# Patient Record
Sex: FEMALE | Race: WHITE | URBAN - METROPOLITAN AREA
[De-identification: names, ages, dates, MRNs, and addresses within clinical notes are randomized per-mention and may not be internally consistent; named-entity substitution may affect disease eponyms.]

---

## 2020-11-18 ENCOUNTER — OFFICE VISIT (OUTPATIENT)
Dept: URBAN - METROPOLITAN AREA CLINIC 105 | Facility: CLINIC | Age: 50
End: 2020-11-18
Payer: OTHER GOVERNMENT

## 2020-11-18 ENCOUNTER — LAB OUTSIDE AN ENCOUNTER (OUTPATIENT)
Dept: URBAN - METROPOLITAN AREA CLINIC 105 | Facility: CLINIC | Age: 50
End: 2020-11-18

## 2020-11-18 DIAGNOSIS — K62.5 RECTAL BLEED: ICD-10-CM

## 2020-11-18 DIAGNOSIS — D13.4 HEPATIC ADENOMA: ICD-10-CM

## 2020-11-18 DIAGNOSIS — K21.00 GASTROESOPHAGEAL REFLUX DISEASE WITH ESOPHAGITIS WITHOUT HEMORRHAGE: ICD-10-CM

## 2020-11-18 DIAGNOSIS — K59.09 CHRONIC CONSTIPATION: ICD-10-CM

## 2020-11-18 DIAGNOSIS — R74.8 ELEVATED LIVER ENZYMES: ICD-10-CM

## 2020-11-18 DIAGNOSIS — K60.2 ANAL FISSURE: ICD-10-CM

## 2020-11-18 DIAGNOSIS — Z12.11 SCREEN FOR COLON CANCER: ICD-10-CM

## 2020-11-18 DIAGNOSIS — K76.0 FATTY LIVER: ICD-10-CM

## 2020-11-18 DIAGNOSIS — R10.32 LLQ ABDOMINAL PAIN: ICD-10-CM

## 2020-11-18 PROCEDURE — 3017F COLORECTAL CA SCREEN DOC REV: CPT | Performed by: INTERNAL MEDICINE

## 2020-11-18 PROCEDURE — G8427 DOCREV CUR MEDS BY ELIG CLIN: HCPCS | Performed by: INTERNAL MEDICINE

## 2020-11-18 PROCEDURE — G8482 FLU IMMUNIZE ORDER/ADMIN: HCPCS | Performed by: INTERNAL MEDICINE

## 2020-11-18 PROCEDURE — G8417 CALC BMI ABV UP PARAM F/U: HCPCS | Performed by: INTERNAL MEDICINE

## 2020-11-18 PROCEDURE — G9903 PT SCRN TBCO ID AS NON USER: HCPCS | Performed by: INTERNAL MEDICINE

## 2020-11-18 PROCEDURE — 99214 OFFICE O/P EST MOD 30 MIN: CPT | Performed by: INTERNAL MEDICINE

## 2020-11-18 RX ORDER — CALCIUM CARBONATE 260MG(650)
TABLET,CHEWABLE ORAL
Qty: 0 | Refills: 0 | Status: ACTIVE | COMMUNITY
Start: 1900-01-01

## 2020-11-18 RX ORDER — LEVOTHYROXINE SODIUM 0.15 MG/1
TABLET ORAL
Qty: 0 | Refills: 0 | Status: ACTIVE | COMMUNITY
Start: 1900-01-01

## 2020-11-18 RX ORDER — LINACLOTIDE 145 UG/1
1 CAPSULE AT LEAST 30 MINUTES BEFORE THE FIRST MEAL OF THE DAY ON AN EMPTY STOMACH CAPSULE, GELATIN COATED ORAL ONCE A DAY
Status: ACTIVE | COMMUNITY

## 2020-11-18 NOTE — HPI-TODAY'S VISIT:
2018 48 yo lady pt of Dr Darin Mcgill. She feels good - tired but good. Her father had a heart attack and her son got deployed to [place]. she says Amitiza stopped working on her. She tried linzess 290 and the first few days she had daily BMs - no more abdominal distension, nausea or discomfort. She then got diarrhea and palpitations and then she tried Linzess 145 "I have no abdominal distension! Violeta never felt so good about my GI, I can zip my pants up!!!". She says "it was horrible! I felt like I was pregnant all the time!". She says LInzess is making her drink more water and she is happy about this. Her daughter went to [place] after law school for a month.  1/6/20 Her mother had a fall and fractured her ankles. Her son is back from [place]. Last week she started to have rectal pain severe, worse with BMs, "it just hurts". Intermittent blood mixed in stool. Hurts when she sits down as well. She feels some rectal swelling. BMs are fine on AMitiza. She only takes AMitiza 8 mcg once a day in the am when she gets home. this is because she has been doing night shifts. Constipation is worse during menses. She has been having LLQ discomfort, better with a BM.  2/3/20 Discussed KUB results in detail. She says she increased amitiza to 8 mcg tid erroneously. She still felt bloated and full. She stopped Amitiza for 2 days. Took linzess 145 "that gave me better results". Bloating "went down". Stools were watery at first, now soft and no longer watery. It is more reliable as she has BMs within 2 hours of taking it. She still has mild LLQ discomfort but overall improved. SHe also hs dysfunctional uterine bleeding, takes iron and this worsens her constipation. She bleeds for 9-10 days and cycle is less than 3 weeks.  11/18/20 c/o lots of abdominal bloating. Says "I like to see you once a year and maybe my symptoms are because I just finished 1000 hours of clinical". Her linzess has changed "it went from soft BMs to watery stools". She is on 145 mcg "its unusual because that is what works for me". She was still feeling incompletely evacuated. She then took miralax and felt better evacuated.  Her LLQ discomfort is back "that must be because I'm not emptying completely. She has a L breast mass "bx is negative but they will remove it still in January". Says due to this had scans done and "they did an MRI showing my liver masses are back". She saw GYN "my GYN Says my uterus has to come out because Im bleeding too much. 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 Labs show nl CBC and CMP wnl

## 2021-01-04 ENCOUNTER — OFFICE VISIT (OUTPATIENT)
Dept: URBAN - METROPOLITAN AREA SURGERY CENTER 16 | Facility: SURGERY CENTER | Age: 51
End: 2021-01-04

## 2021-01-29 ENCOUNTER — CLAIMS CREATED FROM THE CLAIM WINDOW (OUTPATIENT)
Dept: URBAN - METROPOLITAN AREA CLINIC 4 | Facility: CLINIC | Age: 51
End: 2021-01-29
Payer: OTHER GOVERNMENT

## 2021-01-29 ENCOUNTER — OFFICE VISIT (OUTPATIENT)
Dept: URBAN - METROPOLITAN AREA SURGERY CENTER 16 | Facility: SURGERY CENTER | Age: 51
End: 2021-01-29
Payer: OTHER GOVERNMENT

## 2021-01-29 DIAGNOSIS — K63.89 BACTERIAL OVERGROWTH SYNDROME: ICD-10-CM

## 2021-01-29 DIAGNOSIS — K63.89 OTHER SPECIFIED DISEASES OF INTESTINE: ICD-10-CM

## 2021-01-29 DIAGNOSIS — K63.5 BENIGN COLON POLYP: ICD-10-CM

## 2021-01-29 DIAGNOSIS — Z12.11 COLON CANCER SCREENING: ICD-10-CM

## 2021-01-29 PROCEDURE — 45380 COLONOSCOPY AND BIOPSY: CPT | Performed by: INTERNAL MEDICINE

## 2021-01-29 PROCEDURE — G8907 PT DOC NO EVENTS ON DISCHARG: HCPCS | Performed by: INTERNAL MEDICINE

## 2021-01-29 PROCEDURE — 88305 TISSUE EXAM BY PATHOLOGIST: CPT | Performed by: PATHOLOGY

## 2021-01-29 RX ORDER — CALCIUM CARBONATE 260MG(650)
TABLET,CHEWABLE ORAL
Qty: 0 | Refills: 0 | Status: ACTIVE | COMMUNITY
Start: 1900-01-01

## 2021-01-29 RX ORDER — LEVOTHYROXINE SODIUM 0.15 MG/1
TABLET ORAL
Qty: 0 | Refills: 0 | Status: ACTIVE | COMMUNITY
Start: 1900-01-01

## 2021-01-29 RX ORDER — LINACLOTIDE 145 UG/1
1 CAPSULE AT LEAST 30 MINUTES BEFORE THE FIRST MEAL OF THE DAY ON AN EMPTY STOMACH CAPSULE, GELATIN COATED ORAL ONCE A DAY
Status: ACTIVE | COMMUNITY

## 2021-03-16 ENCOUNTER — OFFICE VISIT (OUTPATIENT)
Dept: URBAN - METROPOLITAN AREA CLINIC 17 | Facility: CLINIC | Age: 51
End: 2021-03-16

## 2021-03-16 RX ORDER — CALCIUM CARBONATE 260MG(650)
TABLET,CHEWABLE ORAL
Qty: 0 | Refills: 0 | Status: ACTIVE | COMMUNITY
Start: 1900-01-01

## 2021-03-16 RX ORDER — LEVOTHYROXINE SODIUM 0.15 MG/1
TABLET ORAL
Qty: 0 | Refills: 0 | Status: ACTIVE | COMMUNITY
Start: 1900-01-01

## 2021-03-16 RX ORDER — LINACLOTIDE 145 UG/1
1 CAPSULE AT LEAST 30 MINUTES BEFORE THE FIRST MEAL OF THE DAY ON AN EMPTY STOMACH CAPSULE, GELATIN COATED ORAL ONCE A DAY
Status: ACTIVE | COMMUNITY

## 2021-04-27 ENCOUNTER — OFFICE VISIT (OUTPATIENT)
Dept: URBAN - METROPOLITAN AREA CLINIC 17 | Facility: CLINIC | Age: 51
End: 2021-04-27
Payer: OTHER GOVERNMENT

## 2021-04-27 ENCOUNTER — WEB ENCOUNTER (OUTPATIENT)
Dept: URBAN - METROPOLITAN AREA CLINIC 17 | Facility: CLINIC | Age: 51
End: 2021-04-27

## 2021-04-27 DIAGNOSIS — Z12.11 SCREEN FOR COLON CANCER: ICD-10-CM

## 2021-04-27 DIAGNOSIS — K21.00 GASTROESOPHAGEAL REFLUX DISEASE WITH ESOPHAGITIS WITHOUT HEMORRHAGE: ICD-10-CM

## 2021-04-27 DIAGNOSIS — R10.32 LLQ ABDOMINAL PAIN: ICD-10-CM

## 2021-04-27 DIAGNOSIS — D13.4 HEPATIC ADENOMA: ICD-10-CM

## 2021-04-27 DIAGNOSIS — K76.0 FATTY LIVER: ICD-10-CM

## 2021-04-27 DIAGNOSIS — R11.12 PROJECTILE VOMITING WITH NAUSEA: ICD-10-CM

## 2021-04-27 DIAGNOSIS — K59.09 CHRONIC CONSTIPATION: ICD-10-CM

## 2021-04-27 DIAGNOSIS — K62.5 RECTAL BLEED: ICD-10-CM

## 2021-04-27 DIAGNOSIS — K60.2 ANAL FISSURE: ICD-10-CM

## 2021-04-27 DIAGNOSIS — R74.8 ELEVATED LIVER ENZYMES: ICD-10-CM

## 2021-04-27 DIAGNOSIS — Z83.79 FAMILY HISTORY OF HEPATIC CIRRHOSIS: ICD-10-CM

## 2021-04-27 PROCEDURE — 99214 OFFICE O/P EST MOD 30 MIN: CPT | Performed by: INTERNAL MEDICINE

## 2021-04-27 RX ORDER — LEVOTHYROXINE SODIUM 0.15 MG/1
TABLET ORAL
Qty: 0 | Refills: 0 | Status: ACTIVE | COMMUNITY
Start: 1900-01-01

## 2021-04-27 RX ORDER — PRUCALOPRIDE 2 MG/1
1 TABLET TABLET, FILM COATED ORAL ONCE A DAY
Qty: 30 | OUTPATIENT
Start: 2021-04-27 | End: 2021-05-27

## 2021-04-27 RX ORDER — CALCIUM CARBONATE 260MG(650)
TABLET,CHEWABLE ORAL
Qty: 0 | Refills: 0 | Status: ACTIVE | COMMUNITY
Start: 1900-01-01

## 2021-04-27 RX ORDER — LINACLOTIDE 145 UG/1
1 CAPSULE AT LEAST 30 MINUTES BEFORE THE FIRST MEAL OF THE DAY ON AN EMPTY STOMACH CAPSULE, GELATIN COATED ORAL ONCE A DAY
Status: ACTIVE | COMMUNITY

## 2021-04-27 NOTE — HPI-TODAY'S VISIT:
2018 46 yo lady pt of Dr Darin Mcgill. She feels good - tired but good. Her father had a heart attack and her son got deployed to [place]. she says Amitiza stopped working on her. She tried linzess 290 and the first few days she had daily BMs - no more abdominal distension, nausea or discomfort. She then got diarrhea and palpitations and then she tried Linzess 145 "I have no abdominal distension! Violeta never felt so good about my GI, I can zip my pants up!!!". She says "it was horrible! I felt like I was pregnant all the time!". She says LInzess is making her drink more water and she is happy about this. Her daughter went to [place] after law school for a month.  1/6/20 Her mother had a fall and fractured her ankles. Her son is back from [place]. Last week she started to have rectal pain severe, worse with BMs, "it just hurts". Intermittent blood mixed in stool. Hurts when she sits down as well. She feels some rectal swelling. BMs are fine on AMitiza. She only takes AMitiza 8 mcg once a day in the am when she gets home. this is because she has been doing night shifts. Constipation is worse during menses. She has been having LLQ discomfort, better with a BM.  2/3/20 Discussed KUB results in detail. She says she increased amitiza to 8 mcg tid erroneously. She still felt bloated and full. She stopped Amitiza for 2 days. Took linzess 145 "that gave me better results". Bloating "went down". Stools were watery at first, now soft and no longer watery. It is more reliable as she has BMs within 2 hours of taking it. She still has mild LLQ discomfort but overall improved. SHe also hs dysfunctional uterine bleeding, takes iron and this worsens her constipation. She bleeds for 9-10 days and cycle is less than 3 weeks.  11/18/20 c/o lots of abdominal bloating. Says "I like to see you once a year and maybe my symptoms are because I just finished 1000 hours of clinical". Her linzess has changed "it went from soft BMs to watery stools". She is on 145 mcg "its unusual because that is what works for me". She was still feeling incompletely evacuated. She then took miralax and felt better evacuated.  Her LLQ discomfort is back "that must be because I'm not emptying completely. She has a L breast mass "bx is negative but they will remove it still in January". Says due to this had scans done and "they did an MRI showing my liver masses are back". She saw GYN "my GYN Says my uterus has to come out because Im bleeding too much. 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 Labs show nl CBC and CMP wnl . 4/27/21 Went to work last week and had nausea, vomiting and abd pain after eating a chicken salad.  She went to ER at [place] near her house.  CT abd pelvis w/o contrast (no oral or IV)showed enteritis and fluid in colon "with bubbles'. correlate clinically. Says CBC and CMP wnl K 3.4. wbc wnl. LFTs was "bumped". All these are per pt. "it was 80 and 100s". next day she had diarrhea.  Was given zofran. 4 L of fluids Discussed colon 1/2021 poor prep, RS hyperplastic polyp, melanosis  Her symptoms are resolving. she has not been taking her constipation meds due to her food poisoning. But states motegrity worked better for her.

## 2021-06-17 ENCOUNTER — OFFICE VISIT (OUTPATIENT)
Dept: URBAN - METROPOLITAN AREA CLINIC 105 | Facility: CLINIC | Age: 51
End: 2021-06-17

## 2021-06-17 RX ORDER — LEVOTHYROXINE SODIUM 0.15 MG/1
TABLET ORAL
Qty: 0 | Refills: 0 | Status: ACTIVE | COMMUNITY
Start: 1900-01-01

## 2021-06-17 RX ORDER — CALCIUM CARBONATE 260MG(650)
TABLET,CHEWABLE ORAL
Qty: 0 | Refills: 0 | Status: ACTIVE | COMMUNITY
Start: 1900-01-01

## 2021-06-17 RX ORDER — LINACLOTIDE 145 UG/1
1 CAPSULE AT LEAST 30 MINUTES BEFORE THE FIRST MEAL OF THE DAY ON AN EMPTY STOMACH CAPSULE, GELATIN COATED ORAL ONCE A DAY
Status: ACTIVE | COMMUNITY

## 2021-08-12 ENCOUNTER — TELEPHONE ENCOUNTER (OUTPATIENT)
Dept: URBAN - METROPOLITAN AREA CLINIC 23 | Facility: CLINIC | Age: 51
End: 2021-08-12

## 2021-08-12 RX ORDER — PRUCALOPRIDE 2 MG/1
1 TABLET TABLET, FILM COATED ORAL ONCE A DAY
Qty: 90 | Refills: 0
Start: 2021-04-27 | End: 2021-11-10

## 2021-09-01 ENCOUNTER — OFFICE VISIT (OUTPATIENT)
Dept: URBAN - METROPOLITAN AREA CLINIC 105 | Facility: CLINIC | Age: 51
End: 2021-09-01
Payer: OTHER GOVERNMENT

## 2021-09-01 VITALS
HEART RATE: 72 BPM | WEIGHT: 182.4 LBS | DIASTOLIC BLOOD PRESSURE: 80 MMHG | SYSTOLIC BLOOD PRESSURE: 124 MMHG | TEMPERATURE: 97.5 F | BODY MASS INDEX: 32.32 KG/M2 | HEIGHT: 63 IN

## 2021-09-01 DIAGNOSIS — R10.32 LLQ ABDOMINAL PAIN: ICD-10-CM

## 2021-09-01 DIAGNOSIS — K60.2 ANAL FISSURE: ICD-10-CM

## 2021-09-01 DIAGNOSIS — R11.12 PROJECTILE VOMITING WITH NAUSEA: ICD-10-CM

## 2021-09-01 DIAGNOSIS — K76.0 FATTY LIVER: ICD-10-CM

## 2021-09-01 DIAGNOSIS — Z83.79 FAMILY HISTORY OF HEPATIC CIRRHOSIS: ICD-10-CM

## 2021-09-01 DIAGNOSIS — K21.00 GASTROESOPHAGEAL REFLUX DISEASE WITH ESOPHAGITIS WITHOUT HEMORRHAGE: ICD-10-CM

## 2021-09-01 DIAGNOSIS — K59.09 CHRONIC CONSTIPATION: ICD-10-CM

## 2021-09-01 DIAGNOSIS — D13.4 HEPATIC ADENOMA: ICD-10-CM

## 2021-09-01 DIAGNOSIS — Z12.11 SCREEN FOR COLON CANCER: ICD-10-CM

## 2021-09-01 DIAGNOSIS — R74.8 ELEVATED LIVER ENZYMES: ICD-10-CM

## 2021-09-01 DIAGNOSIS — K62.5 RECTAL BLEED: ICD-10-CM

## 2021-09-01 PROCEDURE — 99213 OFFICE O/P EST LOW 20 MIN: CPT | Performed by: INTERNAL MEDICINE

## 2021-09-01 RX ORDER — LEVOTHYROXINE SODIUM 150 UG/1
1 TABLET IN THE MORNING ON AN EMPTY STOMACH TABLET ORAL ONCE A DAY
Status: ACTIVE | COMMUNITY

## 2021-09-01 RX ORDER — LEVOTHYROXINE SODIUM 0.15 MG/1
TABLET ORAL
Qty: 0 | Refills: 0 | Status: ACTIVE | COMMUNITY
Start: 1900-01-01

## 2021-09-01 RX ORDER — PRUCALOPRIDE 2 MG/1
1 TABLET TABLET, FILM COATED ORAL ONCE A DAY
Qty: 90 | Refills: 0 | Status: ACTIVE | COMMUNITY
Start: 2021-04-27 | End: 2021-11-10

## 2021-09-01 RX ORDER — LINACLOTIDE 145 UG/1
1 CAPSULE AT LEAST 30 MINUTES BEFORE THE FIRST MEAL OF THE DAY ON AN EMPTY STOMACH CAPSULE, GELATIN COATED ORAL ONCE A DAY
Status: DISCONTINUED | COMMUNITY

## 2021-09-01 RX ORDER — CALCIUM CARBONATE 260MG(650)
TABLET,CHEWABLE ORAL
Qty: 0 | Refills: 0 | Status: ACTIVE | COMMUNITY
Start: 1900-01-01

## 2021-09-01 NOTE — HPI-TODAY'S VISIT:
2018 48 yo lady pt of Dr Darin Mcgill. She feels good - tired but good. Her father had a heart attack and her son got deployed to [place]. she says Amitiza stopped working on her. She tried linzess 290 and the first few days she had daily BMs - no more abdominal distension, nausea or discomfort. She then got diarrhea and palpitations and then she tried Linzess 145 "I have no abdominal distension! Violeta never felt so good about my GI, I can zip my pants up!!!". She says "it was horrible! I felt like I was pregnant all the time!". She says LInzess is making her drink more water and she is happy about this. Her daughter went to [place] after law school for a month.  1/6/20 Her mother had a fall and fractured her ankles. Her son is back from [place]. Last week she started to have rectal pain severe, worse with BMs, "it just hurts". Intermittent blood mixed in stool. Hurts when she sits down as well. She feels some rectal swelling. BMs are fine on AMitiza. She only takes AMitiza 8 mcg once a day in the am when she gets home. this is because she has been doing night shifts. Constipation is worse during menses. She has been having LLQ discomfort, better with a BM.  2/3/20 Discussed KUB results in detail. She says she increased amitiza to 8 mcg tid erroneously. She still felt bloated and full. She stopped Amitiza for 2 days. Took linzess 145 "that gave me better results". Bloating "went down". Stools were watery at first, now soft and no longer watery. It is more reliable as she has BMs within 2 hours of taking it. She still has mild LLQ discomfort but overall improved. SHe also hs dysfunctional uterine bleeding, takes iron and this worsens her constipation. She bleeds for 9-10 days and cycle is less than 3 weeks.  11/18/20 c/o lots of abdominal bloating. Says "I like to see you once a year and maybe my symptoms are because I just finished 1000 hours of clinical". Her linzess has changed "it went from soft BMs to watery stools". She is on 145 mcg "its unusual because that is what works for me". She was still feeling incompletely evacuated. She then took miralax and felt better evacuated.  Her LLQ discomfort is back "that must be because I'm not emptying completely. She has a L breast mass "bx is negative but they will remove it still in January". Says due to this had scans done and "they did an MRI showing my liver masses are back". She saw GYN "my GYN Says my uterus has to come out because Im bleeding too much. 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 Labs show nl CBC and CMP wnl . 4/27/21 Went to work last week and had nausea, vomiting and abd pain after eating a chicken salad.  She went to ER at [facility] near her house.  CT abd pelvis w/o contrast (no oral or IV)showed enteritis and fluid in colon "with bubbles'. correlate clinically. Says CBC and CMP wnl K 3.4. wbc wnl. LFTs was "bumped". All these are per pt. "it was 80 and 100s". next day she had diarrhea.  Was given zofran. 4 L of fluids Discussed colon 1/2021 poor prep, RS hyperplastic polyp, melanosis  Her symptoms are resolving. she has not been taking her constipation meds due to her food poisoning. But states motegrity worked better for her.   9/1/21 Here for f/u.  Had an episode of sinusitis since I last saw her.  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  Says Motegrity has done very well. Stools are not liquid. She has no bloating. Does not have diarrhea or FI.  Has had constipation since her cholecystectomy.

## 2021-09-02 ENCOUNTER — LAB OUTSIDE AN ENCOUNTER (OUTPATIENT)
Dept: URBAN - METROPOLITAN AREA CLINIC 92 | Facility: CLINIC | Age: 51
End: 2021-09-02

## 2021-09-02 ENCOUNTER — TELEPHONE ENCOUNTER (OUTPATIENT)
Dept: URBAN - METROPOLITAN AREA CLINIC 92 | Facility: CLINIC | Age: 51
End: 2021-09-02

## 2021-09-02 LAB
A/G RATIO: 1.4
ALBUMIN: 4.2
ALKALINE PHOSPHATASE: 91
ALT (SGPT): 32
AST (SGOT): 28
BILIRUBIN, TOTAL: 0.3
BUN/CREATININE RATIO: 12
BUN: 9
CALCIUM: 9
CARBON DIOXIDE, TOTAL: 20
CHLORIDE: 106
CREATININE: 0.77
EGFR IF AFRICN AM: 103
EGFR IF NONAFRICN AM: 90
GLOBULIN, TOTAL: 3
GLUCOSE: 125
POTASSIUM: 4.7
PROTEIN, TOTAL: 7.2
SODIUM: 139

## 2021-09-13 ENCOUNTER — TELEPHONE ENCOUNTER (OUTPATIENT)
Dept: URBAN - METROPOLITAN AREA CLINIC 92 | Facility: CLINIC | Age: 51
End: 2021-09-13

## 2021-09-13 RX ORDER — PRUCALOPRIDE 2 MG/1
1 TABLET TABLET, FILM COATED ORAL ONCE A DAY
Qty: 90 | Refills: 3
Start: 2021-04-27 | End: 2022-09-08

## 2022-08-10 ENCOUNTER — TELEPHONE ENCOUNTER (OUTPATIENT)
Dept: URBAN - METROPOLITAN AREA CLINIC 105 | Facility: CLINIC | Age: 52
End: 2022-08-10

## 2022-08-10 RX ORDER — PRUCALOPRIDE 2 MG/1
1 TABLET TABLET, FILM COATED ORAL ONCE A DAY
Qty: 90 | Refills: 1
Start: 2021-04-27 | End: 2023-02-06

## 2022-08-29 ENCOUNTER — OFFICE VISIT (OUTPATIENT)
Dept: URBAN - METROPOLITAN AREA CLINIC 17 | Facility: CLINIC | Age: 52
End: 2022-08-29

## 2022-10-11 ENCOUNTER — OFFICE VISIT (OUTPATIENT)
Dept: URBAN - METROPOLITAN AREA CLINIC 17 | Facility: CLINIC | Age: 52
End: 2022-10-11
Payer: OTHER GOVERNMENT

## 2022-10-11 VITALS
HEIGHT: 63 IN | WEIGHT: 181.2 LBS | BODY MASS INDEX: 32.11 KG/M2 | TEMPERATURE: 97.3 F | DIASTOLIC BLOOD PRESSURE: 84 MMHG | HEART RATE: 76 BPM | SYSTOLIC BLOOD PRESSURE: 149 MMHG

## 2022-10-11 DIAGNOSIS — K62.5 RECTAL BLEED: ICD-10-CM

## 2022-10-11 DIAGNOSIS — Z12.11 SCREEN FOR COLON CANCER: ICD-10-CM

## 2022-10-11 DIAGNOSIS — K76.0 FATTY LIVER: ICD-10-CM

## 2022-10-11 DIAGNOSIS — R11.12 PROJECTILE VOMITING WITH NAUSEA: ICD-10-CM

## 2022-10-11 DIAGNOSIS — R74.8 ELEVATED LIVER ENZYMES: ICD-10-CM

## 2022-10-11 DIAGNOSIS — K60.2 ANAL FISSURE: ICD-10-CM

## 2022-10-11 DIAGNOSIS — R10.32 LLQ ABDOMINAL PAIN: ICD-10-CM

## 2022-10-11 DIAGNOSIS — K21.00 GASTROESOPHAGEAL REFLUX DISEASE WITH ESOPHAGITIS WITHOUT HEMORRHAGE: ICD-10-CM

## 2022-10-11 DIAGNOSIS — D13.4 HEPATIC ADENOMA: ICD-10-CM

## 2022-10-11 DIAGNOSIS — Z83.79 FAMILY HISTORY OF HEPATIC CIRRHOSIS: ICD-10-CM

## 2022-10-11 DIAGNOSIS — K59.09 CHRONIC CONSTIPATION: ICD-10-CM

## 2022-10-11 PROBLEM — 197321007: Status: ACTIVE | Noted: 2020-11-18

## 2022-10-11 PROCEDURE — 99213 OFFICE O/P EST LOW 20 MIN: CPT | Performed by: INTERNAL MEDICINE

## 2022-10-11 RX ORDER — PRUCALOPRIDE 2 MG/1
1 TABLET TABLET, FILM COATED ORAL ONCE A DAY
Qty: 90 | Refills: 3
Start: 2021-04-27 | End: 2023-10-06

## 2022-10-11 RX ORDER — LEVOTHYROXINE SODIUM 150 UG/1
1 TABLET IN THE MORNING ON AN EMPTY STOMACH TABLET ORAL ONCE A DAY
Status: ACTIVE | COMMUNITY

## 2022-10-11 RX ORDER — LEVOTHYROXINE SODIUM 0.15 MG/1
TABLET ORAL
Qty: 0 | Refills: 0 | Status: ACTIVE | COMMUNITY
Start: 1900-01-01

## 2022-10-11 RX ORDER — PRUCALOPRIDE 2 MG/1
1 TABLET TABLET, FILM COATED ORAL ONCE A DAY
Qty: 90 | Refills: 1 | Status: ACTIVE | COMMUNITY
Start: 2021-04-27 | End: 2023-02-06

## 2022-10-11 RX ORDER — CALCIUM CARBONATE 260MG(650)
TABLET,CHEWABLE ORAL
Qty: 0 | Refills: 0 | Status: ACTIVE | COMMUNITY
Start: 1900-01-01

## 2022-10-11 NOTE — HPI-TODAY'S VISIT:
2018 46 yo lady pt of Dr Darin Mcgill. She feels good - tired but good. Her father had a heart attack and her son got deployed to [place]. she says Amitiza stopped working on her. She tried linzess 290 and the first few days she had daily BMs - no more abdominal distension, nausea or discomfort. She then got diarrhea and palpitations and then she tried Linzess 145 "I have no abdominal distension! Violeta never felt so good about my GI, I can zip my pants up!!!". She says "it was horrible! I felt like I was pregnant all the time!". She says LInzess is making her drink more water and she is happy about this. Her daughter went to [place] after law school for a month.  1/6/20 Her mother had a fall and fractured her ankles. Her son is back from [place]. Last week she started to have rectal pain severe, worse with BMs, "it just hurts". Intermittent blood mixed in stool. Hurts when she sits down as well. She feels some rectal swelling. BMs are fine on AMitiza. She only takes AMitiza 8 mcg once a day in the am when she gets home. this is because she has been doing night shifts. Constipation is worse during menses. She has been having LLQ discomfort, better with a BM.  2/3/20 Discussed KUB results in detail. She says she increased amitiza to 8 mcg tid erroneously. She still felt bloated and full. She stopped Amitiza for 2 days. Took linzess 145 "that gave me better results". Bloating "went down". Stools were watery at first, now soft and no longer watery. It is more reliable as she has BMs within 2 hours of taking it. She still has mild LLQ discomfort but overall improved. SHe also hs dysfunctional uterine bleeding, takes iron and this worsens her constipation. She bleeds for 9-10 days and cycle is less than 3 weeks.  11/18/20 c/o lots of abdominal bloating. Says "I like to see you once a year and maybe my symptoms are because I just finished 1000 hours of clinical". Her linzess has changed "it went from soft BMs to watery stools". She is on 145 mcg "its unusual because that is what works for me". She was still feeling incompletely evacuated. She then took miralax and felt better evacuated.  Her LLQ discomfort is back "that must be because I'm not emptying completely. She has a L breast mass "bx is negative but they will remove it still in January". Says due to this had scans done and "they did an MRI showing my liver masses are back". She saw GYN "my GYN Says my uterus has to come out because Im bleeding too much. 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 Labs show nl CBC and CMP wnl . 4/27/21 Went to work last week and had nausea, vomiting and abd pain after eating a chicken salad.  She went to ER at [place] near her house.  CT abd pelvis w/o contrast (no oral or IV)showed enteritis and fluid in colon "with bubbles'. correlate clinically. Says CBC and CMP wnl K 3.4. wbc wnl. LFTs was "bumped". All these are per pt. "it was 80 and 100s". next day she had diarrhea.  Was given zofran. 4 L of fluids Discussed colon 1/2021 poor prep, RS hyperplastic polyp, melanosis  Her symptoms are resolving. she has not been taking her constipation meds due to her food poisoning. But states motegrity worked better for her.   9/1/21 Here for f/u.  Had an episode of sinusitis since I last saw her.  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  Says Motegrity has done very well. Stools are not liquid. She has no bloating. Does not have diarrhea or FI.  Has had constipation since her cholecystectomy.  10/11/22 Here for f.u visit Is doing well on Motegrity Says parents are stressful - BP, cholesterol is up.  Says DR Mcgill checked her LFts and they are down

## 2023-01-11 ENCOUNTER — TELEPHONE ENCOUNTER (OUTPATIENT)
Dept: URBAN - METROPOLITAN AREA CLINIC 17 | Facility: CLINIC | Age: 53
End: 2023-01-11

## 2023-01-11 RX ORDER — PRUCALOPRIDE 2 MG/1
1 TABLET TABLET, FILM COATED ORAL ONCE A DAY
Qty: 90 | Refills: 3
Start: 2021-04-27 | End: 2024-01-06

## 2023-07-05 ENCOUNTER — TELEPHONE ENCOUNTER (OUTPATIENT)
Dept: URBAN - METROPOLITAN AREA CLINIC 17 | Facility: CLINIC | Age: 53
End: 2023-07-05

## 2023-12-19 ENCOUNTER — TELEPHONE ENCOUNTER (OUTPATIENT)
Dept: URBAN - METROPOLITAN AREA CLINIC 105 | Facility: CLINIC | Age: 53
End: 2023-12-19

## 2023-12-19 ENCOUNTER — TELEPHONE ENCOUNTER (OUTPATIENT)
Dept: URBAN - METROPOLITAN AREA CLINIC 17 | Facility: CLINIC | Age: 53
End: 2023-12-19

## 2023-12-19 RX ORDER — PRUCALOPRIDE 2 MG/1
1 TABLET TABLET, FILM COATED ORAL ONCE A DAY
Qty: 90 | Refills: 0
Start: 2021-04-27 | End: 2024-03-18

## 2024-02-15 ENCOUNTER — LAB (OUTPATIENT)
Dept: URBAN - METROPOLITAN AREA CLINIC 17 | Facility: CLINIC | Age: 54
End: 2024-02-15

## 2024-02-15 ENCOUNTER — OV EP (OUTPATIENT)
Dept: URBAN - METROPOLITAN AREA CLINIC 17 | Facility: CLINIC | Age: 54
End: 2024-02-15
Payer: OTHER GOVERNMENT

## 2024-02-15 VITALS
SYSTOLIC BLOOD PRESSURE: 145 MMHG | TEMPERATURE: 98.1 F | WEIGHT: 185 LBS | BODY MASS INDEX: 32.78 KG/M2 | HEART RATE: 77 BPM | DIASTOLIC BLOOD PRESSURE: 82 MMHG | HEIGHT: 63 IN

## 2024-02-15 DIAGNOSIS — R10.32 LLQ ABDOMINAL PAIN: ICD-10-CM

## 2024-02-15 DIAGNOSIS — D13.4 HEPATIC ADENOMA: ICD-10-CM

## 2024-02-15 DIAGNOSIS — R74.8 ELEVATED LIVER ENZYMES: ICD-10-CM

## 2024-02-15 DIAGNOSIS — R11.12 PROJECTILE VOMITING WITH NAUSEA: ICD-10-CM

## 2024-02-15 DIAGNOSIS — K21.00 GASTROESOPHAGEAL REFLUX DISEASE WITH ESOPHAGITIS WITHOUT HEMORRHAGE: ICD-10-CM

## 2024-02-15 DIAGNOSIS — K60.2 ANAL FISSURE: ICD-10-CM

## 2024-02-15 DIAGNOSIS — K76.0 FATTY LIVER: ICD-10-CM

## 2024-02-15 DIAGNOSIS — Z12.11 SCREEN FOR COLON CANCER: ICD-10-CM

## 2024-02-15 DIAGNOSIS — K59.09 CHRONIC CONSTIPATION: ICD-10-CM

## 2024-02-15 DIAGNOSIS — K62.5 RECTAL BLEED: ICD-10-CM

## 2024-02-15 DIAGNOSIS — Z83.79 FAMILY HISTORY OF HEPATIC CIRRHOSIS: ICD-10-CM

## 2024-02-15 PROCEDURE — 99214 OFFICE O/P EST MOD 30 MIN: CPT | Performed by: INTERNAL MEDICINE

## 2024-02-15 RX ORDER — PRUCALOPRIDE 2 MG/1
1 TABLET TABLET, FILM COATED ORAL ONCE A DAY
Qty: 90 | Refills: 0 | Status: ACTIVE | COMMUNITY
Start: 2021-04-27 | End: 2024-03-18

## 2024-02-15 RX ORDER — CALCIUM CARBONATE 260MG(650)
TABLET,CHEWABLE ORAL
Qty: 0 | Refills: 0 | Status: ACTIVE | COMMUNITY
Start: 1900-01-01

## 2024-02-15 RX ORDER — PRUCALOPRIDE 2 MG/1
1 TABLET TABLET, FILM COATED ORAL ONCE A DAY
Qty: 90 | Refills: 3
Start: 2021-04-27 | End: 2025-02-09

## 2024-02-15 RX ORDER — LEVOTHYROXINE SODIUM 0.15 MG/1
TABLET ORAL
Qty: 0 | Refills: 0 | Status: ON HOLD | COMMUNITY
Start: 1900-01-01

## 2024-02-15 RX ORDER — POLYETHYLENE GLYCOL-3350 AND ELECTROLYTES WITH FLAVOR PACK 240; 5.84; 2.98; 6.72; 22.72 G/278.26G; G/278.26G; G/278.26G; G/278.26G; G/278.26G
AS DIRECTED POWDER, FOR SOLUTION ORAL 1
Qty: 1 | Refills: 0 | OUTPATIENT
Start: 2024-02-15 | End: 2024-02-16

## 2024-02-15 RX ORDER — LEVOTHYROXINE SODIUM 150 UG/1
1 TABLET IN THE MORNING ON AN EMPTY STOMACH TABLET ORAL ONCE A DAY
Status: ACTIVE | COMMUNITY

## 2024-02-15 NOTE — HPI-TODAY'S VISIT:
2018 46 yo lady pt of Dr Darin Mcgill. She feels good - tired but good. Her father had a heart attack and her son got deployed to [place]. she says Amitiza stopped working on her. She tried linzess 290 and the first few days she had daily BMs - no more abdominal distension, nausea or discomfort. She then got diarrhea and palpitations and then she tried Linzess 145 "I have no abdominal distension! Violeta never felt so good about my GI, I can zip my pants up!!!". She says "it was horrible! I felt like I was pregnant all the time!". She says LInzess is making her drink more water and she is happy about this. Her daughter went to [place] after law school for a month.  1/6/20 Her mother had a fall and fractured her ankles. Her son is back from [place]. Last week she started to have rectal pain severe, worse with BMs, "it just hurts". Intermittent blood mixed in stool. Hurts when she sits down as well. She feels some rectal swelling. BMs are fine on AMitiza. She only takes AMitiza 8 mcg once a day in the am when she gets home. this is because she has been doing night shifts. Constipation is worse during menses. She has been having LLQ discomfort, better with a BM.  2/3/20 Discussed KUB results in detail. She says she increased amitiza to 8 mcg tid erroneously. She still felt bloated and full. She stopped Amitiza for 2 days. Took linzess 145 "that gave me better results". Bloating "went down". Stools were watery at first, now soft and no longer watery. It is more reliable as she has BMs within 2 hours of taking it. She still has mild LLQ discomfort but overall improved. SHe also hs dysfunctional uterine bleeding, takes iron and this worsens her constipation. She bleeds for 9-10 days and cycle is less than 3 weeks.  11/18/20 c/o lots of abdominal bloating. Says "I like to see you once a year and maybe my symptoms are because I just finished 1000 hours of clinical". Her linzess has changed "it went from soft BMs to watery stools". She is on 145 mcg "its unusual because that is what works for me". She was still feeling incompletely evacuated. She then took miralax and felt better evacuated.  Her LLQ discomfort is back "that must be because I'm not emptying completely. She has a L breast mass "bx is negative but they will remove it still in January". Says due to this had scans done and "they did an MRI showing my liver masses are back". She saw GYN "my GYN Says my uterus has to come out because Im bleeding too much. 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 Labs show nl CBC and CMP wnl . 4/27/21 Went to work last week and had nausea, vomiting and abd pain after eating a chicken salad.  She went to ER at [place] near her house.  CT abd pelvis w/o contrast (no oral or IV)showed enteritis and fluid in colon "with bubbles'. correlate clinically. Says CBC and CMP wnl K 3.4. wbc wnl. LFTs was "bumped". All these are per pt. "it was 80 and 100s". next day she had diarrhea.  Was given zofran. 4 L of fluids Discussed colon 1/2021 poor prep, RS hyperplastic polyp, melanosis  Her symptoms are resolving. she has not been taking her constipation meds due to her food poisoning. But states motegrity worked better for her.   9/1/21 Here for f/u.  Had an episode of sinusitis since I last saw her.  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  Says Motegrity has done very well. Stools are not liquid. She has no bloating. Does not have diarrhea or FI.  Has had constipation since her cholecystectomy.  10/11/22 Here for f.u visit Is doing well on Motegrity Says parents are stressful - BP, cholesterol is up.  Says DR Mcgill checked her LFts and they are down.  2/15/24 Her for f.u visit Going through menopause, dry eyes, period every 4 months. Had been wheezing and CT showed pulm nodules. But stable.Thinks she may have Sjogrens or RhA and is going to see Rheum in June.  Is on an inhaler.  Motegrity takes care of her constipation.

## 2024-05-08 ENCOUNTER — TELEPHONE ENCOUNTER (OUTPATIENT)
Dept: URBAN - METROPOLITAN AREA CLINIC 17 | Facility: CLINIC | Age: 54
End: 2024-05-08

## 2024-05-16 ENCOUNTER — TELEPHONE ENCOUNTER (OUTPATIENT)
Dept: URBAN - METROPOLITAN AREA CLINIC 17 | Facility: CLINIC | Age: 54
End: 2024-05-16

## 2024-07-03 ENCOUNTER — TELEPHONE ENCOUNTER (OUTPATIENT)
Dept: URBAN - METROPOLITAN AREA CLINIC 17 | Facility: CLINIC | Age: 54
End: 2024-07-03

## 2024-07-08 ENCOUNTER — LAB OUTSIDE AN ENCOUNTER (OUTPATIENT)
Dept: URBAN - METROPOLITAN AREA CLINIC 17 | Facility: CLINIC | Age: 54
End: 2024-07-08

## 2024-07-08 ENCOUNTER — DASHBOARD ENCOUNTERS (OUTPATIENT)
Age: 54
End: 2024-07-08

## 2024-07-08 ENCOUNTER — OFFICE VISIT (OUTPATIENT)
Dept: URBAN - METROPOLITAN AREA CLINIC 17 | Facility: CLINIC | Age: 54
End: 2024-07-08
Payer: OTHER GOVERNMENT

## 2024-07-08 VITALS
SYSTOLIC BLOOD PRESSURE: 148 MMHG | TEMPERATURE: 97.9 F | BODY MASS INDEX: 33.03 KG/M2 | HEART RATE: 82 BPM | HEIGHT: 63 IN | WEIGHT: 186.4 LBS | DIASTOLIC BLOOD PRESSURE: 98 MMHG

## 2024-07-08 DIAGNOSIS — R74.8 ELEVATED LIVER ENZYMES: ICD-10-CM

## 2024-07-08 DIAGNOSIS — K62.5 RECTAL BLEED: ICD-10-CM

## 2024-07-08 DIAGNOSIS — R13.19 CERVICAL DYSPHAGIA: ICD-10-CM

## 2024-07-08 DIAGNOSIS — K59.09 CHRONIC CONSTIPATION: ICD-10-CM

## 2024-07-08 PROBLEM — 786838002: Status: ACTIVE | Noted: 2024-07-08

## 2024-07-08 PROBLEM — 30233002: Status: ACTIVE | Noted: 2024-07-08

## 2024-07-08 PROCEDURE — 99214 OFFICE O/P EST MOD 30 MIN: CPT | Performed by: INTERNAL MEDICINE

## 2024-07-08 RX ORDER — DEXLANSOPRAZOLE 60 MG/1
1 CAPSULE CAPSULE, DELAYED RELEASE ORAL ONCE A DAY
Qty: 90 CAPSULE | Refills: 3 | OUTPATIENT
Start: 2024-07-08

## 2024-07-08 RX ORDER — LEVOTHYROXINE SODIUM 150 UG/1
1 TABLET IN THE MORNING ON AN EMPTY STOMACH TABLET ORAL ONCE A DAY
Status: ACTIVE | COMMUNITY

## 2024-07-08 RX ORDER — LEVOTHYROXINE SODIUM 0.15 MG/1
TABLET ORAL
Qty: 0 | Refills: 0 | Status: ON HOLD | COMMUNITY
Start: 1900-01-01

## 2024-07-08 RX ORDER — PRUCALOPRIDE 2 MG/1
1 TABLET TABLET, FILM COATED ORAL ONCE A DAY
Qty: 90 | Refills: 3

## 2024-07-08 RX ORDER — CALCIUM CARBONATE 260MG(650)
TABLET,CHEWABLE ORAL
Qty: 0 | Refills: 0 | Status: ACTIVE | COMMUNITY
Start: 1900-01-01

## 2024-07-08 RX ORDER — PRUCALOPRIDE 2 MG/1
1 TABLET TABLET, FILM COATED ORAL ONCE A DAY
Qty: 90 | Refills: 3 | Status: ACTIVE | COMMUNITY
Start: 2021-04-27 | End: 2025-02-09

## 2024-07-08 NOTE — HPI-TODAY'S VISIT:
2018 46 yo lady pt of Dr Darin Mcgill. She feels good - tired but good. Her father had a heart attack and her son got deployed to [place]. she says Amitiza stopped working on her. She tried linzess 290 and the first few days she had daily BMs - no more abdominal distension, nausea or discomfort. She then got diarrhea and palpitations and then she tried Linzess 145 "I have no abdominal distension! Violeta never felt so good about my GI, I can zip my pants up!!!". She says "it was horrible! I felt like I was pregnant all the time!". She says LInzess is making her drink more water and she is happy about this. Her daughter went to [place] after law school for a month.  1/6/20 Her mother had a fall and fractured her ankles. Her son is back from [place]. Last week she started to have rectal pain severe, worse with BMs, "it just hurts". Intermittent blood mixed in stool. Hurts when she sits down as well. She feels some rectal swelling. BMs are fine on AMitiza. She only takes AMitiza 8 mcg once a day in the am when she gets home. this is because she has been doing night shifts. Constipation is worse during menses. She has been having LLQ discomfort, better with a BM.  2/3/20 Discussed KUB results in detail. She says she increased amitiza to 8 mcg tid erroneously. She still felt bloated and full. She stopped Amitiza for 2 days. Took linzess 145 "that gave me better results". Bloating "went down". Stools were watery at first, now soft and no longer watery. It is more reliable as she has BMs within 2 hours of taking it. She still has mild LLQ discomfort but overall improved. SHe also hs dysfunctional uterine bleeding, takes iron and this worsens her constipation. She bleeds for 9-10 days and cycle is less than 3 weeks.  11/18/20 c/o lots of abdominal bloating. Says "I like to see you once a year and maybe my symptoms are because I just finished 1000 hours of clinical". Her linzess has changed "it went from soft BMs to watery stools". She is on 145 mcg "its unusual because that is what works for me". She was still feeling incompletely evacuated. She then took miralax and felt better evacuated.  Her LLQ discomfort is back "that must be because I'm not emptying completely. She has a L breast mass "bx is negative but they will remove it still in January". Says due to this had scans done and "they did an MRI showing my liver masses are back". She saw GYN "my GYN Says my uterus has to come out because Im bleeding too much. 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 Labs show nl CBC and CMP wnl . 4/27/21 Went to work last week and had nausea, vomiting and abd pain after eating a chicken salad.  She went to ER at [place] near her house.  CT abd pelvis w/o contrast (no oral or IV)showed enteritis and fluid in colon "with bubbles'. correlate clinically. Says CBC and CMP wnl K 3.4. wbc wnl. LFTs was "bumped". All these are per pt. "it was 80 and 100s". next day she had diarrhea.  Was given zofran. 4 L of fluids Discussed colon 1/2021 poor prep, RS hyperplastic polyp, melanosis  Her symptoms are resolving. she has not been taking her constipation meds due to her food poisoning. But states motegrity worked better for her.   9/1/21 Here for f/u.  Had an episode of sinusitis since I last saw her.  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  Says Motegrity has done very well. Stools are not liquid. She has no bloating. Does not have diarrhea or FI.  Has had constipation since her cholecystectomy.  10/11/22 Here for f.u visit Is doing well on Motegrity Says parents are stressful - BP, cholesterol is up.  Says DR Mcgill checked her LFts and they are down.  2/15/24 Her for f.u visit Going through menopause, dry eyes, period every 4 months. Had been wheezing and CT showed pulm nodules. But stable.Thinks she may have Sjogrens or RhA and is going to see Rheum in June.  Is on an inhaler.  Motegrity takes care of her constipation.  7/8/24 Here for pill/food dysphagia REviewed EGD from 2012 with GISELA RAMON esophagitis/ Has not been on PPI Says when she s since then which helps.  Recent steroid inhaler -this was for wheezing in Jan/Feb/March of this year. Off of it in April.

## 2024-07-12 ENCOUNTER — TELEPHONE ENCOUNTER (OUTPATIENT)
Dept: URBAN - METROPOLITAN AREA CLINIC 105 | Facility: CLINIC | Age: 54
End: 2024-07-12

## 2024-07-12 RX ORDER — OMEPRAZOLE 40 MG/1
1 CAPSULE 30 MINUTES BEFORE MORNING MEAL CAPSULE, DELAYED RELEASE ORAL ONCE A DAY
Qty: 90 | Refills: 3 | OUTPATIENT
Start: 2024-07-12

## 2024-07-17 ENCOUNTER — OFFICE VISIT (OUTPATIENT)
Dept: URBAN - METROPOLITAN AREA MEDICAL CENTER 33 | Facility: MEDICAL CENTER | Age: 54
End: 2024-07-17
Payer: OTHER GOVERNMENT

## 2024-07-17 DIAGNOSIS — R13.19 CERVICAL DYSPHAGIA: ICD-10-CM

## 2024-07-17 PROCEDURE — 43239 EGD BIOPSY SINGLE/MULTIPLE: CPT | Performed by: INTERNAL MEDICINE

## 2024-07-17 RX ORDER — LEVOTHYROXINE SODIUM 150 UG/1
1 TABLET IN THE MORNING ON AN EMPTY STOMACH TABLET ORAL ONCE A DAY
Status: ACTIVE | COMMUNITY

## 2024-07-17 RX ORDER — PRUCALOPRIDE 2 MG/1
1 TABLET TABLET, FILM COATED ORAL ONCE A DAY
Qty: 90 | Refills: 3 | Status: ACTIVE | COMMUNITY

## 2024-07-17 RX ORDER — LEVOTHYROXINE SODIUM 0.15 MG/1
TABLET ORAL
Qty: 0 | Refills: 0 | Status: ON HOLD | COMMUNITY
Start: 1900-01-01

## 2024-07-17 RX ORDER — DEXLANSOPRAZOLE 60 MG/1
1 CAPSULE CAPSULE, DELAYED RELEASE ORAL ONCE A DAY
Qty: 90 CAPSULE | Refills: 3 | Status: ACTIVE | COMMUNITY
Start: 2024-07-08

## 2024-07-17 RX ORDER — CALCIUM CARBONATE 260MG(650)
TABLET,CHEWABLE ORAL
Qty: 0 | Refills: 0 | Status: ACTIVE | COMMUNITY
Start: 1900-01-01

## 2024-07-17 RX ORDER — OMEPRAZOLE 40 MG/1
1 CAPSULE 30 MINUTES BEFORE MORNING MEAL CAPSULE, DELAYED RELEASE ORAL ONCE A DAY
Qty: 90 | Refills: 3 | Status: ACTIVE | COMMUNITY
Start: 2024-07-12

## 2024-11-06 ENCOUNTER — OFFICE VISIT (OUTPATIENT)
Dept: URBAN - METROPOLITAN AREA MEDICAL CENTER 33 | Facility: MEDICAL CENTER | Age: 54
End: 2024-11-06
Payer: OTHER GOVERNMENT

## 2024-11-06 DIAGNOSIS — Z12.11 COLON CANCER SCREENING: ICD-10-CM

## 2024-11-06 DIAGNOSIS — Z53.8 FAILED ATTEMPTED SURGICAL PROCEDURE: ICD-10-CM

## 2024-11-06 PROCEDURE — G0121 COLON CA SCRN NOT HI RSK IND: HCPCS | Performed by: INTERNAL MEDICINE

## 2024-11-06 RX ORDER — DEXLANSOPRAZOLE 60 MG/1
1 CAPSULE CAPSULE, DELAYED RELEASE ORAL ONCE A DAY
Qty: 90 CAPSULE | Refills: 3 | Status: ACTIVE | COMMUNITY
Start: 2024-07-08

## 2024-11-06 RX ORDER — OMEPRAZOLE 40 MG/1
1 CAPSULE 30 MINUTES BEFORE MORNING MEAL CAPSULE, DELAYED RELEASE ORAL ONCE A DAY
Qty: 90 | Refills: 3 | Status: ACTIVE | COMMUNITY
Start: 2024-07-12

## 2024-11-06 RX ORDER — LEVOTHYROXINE SODIUM 0.15 MG/1
TABLET ORAL
Qty: 0 | Refills: 0 | Status: ON HOLD | COMMUNITY
Start: 1900-01-01

## 2024-11-06 RX ORDER — CALCIUM CARBONATE 260MG(650)
TABLET,CHEWABLE ORAL
Qty: 0 | Refills: 0 | Status: ACTIVE | COMMUNITY
Start: 1900-01-01

## 2024-11-06 RX ORDER — PRUCALOPRIDE 2 MG/1
1 TABLET TABLET, FILM COATED ORAL ONCE A DAY
Qty: 90 | Refills: 3 | Status: ACTIVE | COMMUNITY

## 2024-11-06 RX ORDER — LEVOTHYROXINE SODIUM 150 UG/1
1 TABLET IN THE MORNING ON AN EMPTY STOMACH TABLET ORAL ONCE A DAY
Status: ACTIVE | COMMUNITY

## 2025-07-15 ENCOUNTER — TELEPHONE ENCOUNTER (OUTPATIENT)
Dept: URBAN - METROPOLITAN AREA CLINIC 105 | Facility: CLINIC | Age: 55
End: 2025-07-15

## 2025-07-15 RX ORDER — PRUCALOPRIDE 2 MG/1
1 TABLET TABLET, FILM COATED ORAL ONCE A DAY
Qty: 90 TABLET | Refills: 3